# Patient Record
Sex: MALE | URBAN - METROPOLITAN AREA
[De-identification: names, ages, dates, MRNs, and addresses within clinical notes are randomized per-mention and may not be internally consistent; named-entity substitution may affect disease eponyms.]

---

## 2017-06-02 ENCOUNTER — HOSPITAL ENCOUNTER (EMERGENCY)
Facility: CLINIC | Age: 42
Discharge: HOME | End: 2017-06-03
Attending: EMERGENCY MEDICINE | Admitting: EMERGENCY MEDICINE

## 2017-06-02 DIAGNOSIS — I47.10 PAROXYSMAL SUPRAVENTRICULAR TACHYCARDIA (H): ICD-10-CM

## 2017-06-02 LAB
ALBUMIN SERPL-MCNC: 4 G/DL (ref 3.4–5)
ALP SERPL-CCNC: 57 U/L (ref 40–150)
ALT SERPL W P-5'-P-CCNC: 87 U/L (ref 0–70)
ANION GAP SERPL CALCULATED.3IONS-SCNC: 10 MMOL/L (ref 3–14)
AST SERPL W P-5'-P-CCNC: 47 U/L (ref 0–45)
BASOPHILS # BLD AUTO: 0 10E9/L (ref 0–0.2)
BASOPHILS NFR BLD AUTO: 0.3 %
BILIRUB SERPL-MCNC: 0.5 MG/DL (ref 0.2–1.3)
BUN SERPL-MCNC: 14 MG/DL (ref 7–30)
CALCIUM SERPL-MCNC: 9.1 MG/DL (ref 8.5–10.1)
CHLORIDE SERPL-SCNC: 105 MMOL/L (ref 94–109)
CO2 SERPL-SCNC: 26 MMOL/L (ref 20–32)
CREAT SERPL-MCNC: 1.52 MG/DL (ref 0.66–1.25)
DIFFERENTIAL METHOD BLD: ABNORMAL
EOSINOPHIL # BLD AUTO: 0.3 10E9/L (ref 0–0.7)
EOSINOPHIL NFR BLD AUTO: 2.3 %
ERYTHROCYTE [DISTWIDTH] IN BLOOD BY AUTOMATED COUNT: 13.2 % (ref 10–15)
GFR SERPL CREATININE-BSD FRML MDRD: 50 ML/MIN/1.7M2
GLUCOSE SERPL-MCNC: 156 MG/DL (ref 70–99)
HCT VFR BLD AUTO: 43.2 % (ref 40–53)
HGB BLD-MCNC: 15.4 G/DL (ref 13.3–17.7)
IMM GRANULOCYTES # BLD: 0.1 10E9/L (ref 0–0.4)
IMM GRANULOCYTES NFR BLD: 0.4 %
LYMPHOCYTES # BLD AUTO: 4 10E9/L (ref 0.8–5.3)
LYMPHOCYTES NFR BLD AUTO: 33.6 %
MCH RBC QN AUTO: 32 PG (ref 26.5–33)
MCHC RBC AUTO-ENTMCNC: 35.6 G/DL (ref 31.5–36.5)
MCV RBC AUTO: 90 FL (ref 78–100)
MONOCYTES # BLD AUTO: 0.9 10E9/L (ref 0–1.3)
MONOCYTES NFR BLD AUTO: 7.1 %
NEUTROPHILS # BLD AUTO: 6.8 10E9/L (ref 1.6–8.3)
NEUTROPHILS NFR BLD AUTO: 56.3 %
NRBC # BLD AUTO: 0 10*3/UL
NRBC BLD AUTO-RTO: 0 /100
PLATELET # BLD AUTO: 214 10E9/L (ref 150–450)
POTASSIUM SERPL-SCNC: 3.9 MMOL/L (ref 3.4–5.3)
PROT SERPL-MCNC: 7.5 G/DL (ref 6.8–8.8)
RBC # BLD AUTO: 4.82 10E12/L (ref 4.4–5.9)
SODIUM SERPL-SCNC: 141 MMOL/L (ref 133–144)
T4 FREE SERPL-MCNC: 1.16 NG/DL (ref 0.76–1.46)
TROPONIN I SERPL-MCNC: NORMAL UG/L (ref 0–0.04)
TSH SERPL DL<=0.005 MIU/L-ACNC: 6.39 MU/L (ref 0.4–4)
WBC # BLD AUTO: 12 10E9/L (ref 4–11)

## 2017-06-02 PROCEDURE — 96374 THER/PROPH/DIAG INJ IV PUSH: CPT

## 2017-06-02 PROCEDURE — 99284 EMERGENCY DEPT VISIT MOD MDM: CPT | Mod: 25

## 2017-06-02 PROCEDURE — 85025 COMPLETE CBC W/AUTO DIFF WBC: CPT | Performed by: EMERGENCY MEDICINE

## 2017-06-02 PROCEDURE — 84439 ASSAY OF FREE THYROXINE: CPT | Performed by: EMERGENCY MEDICINE

## 2017-06-02 PROCEDURE — 25000128 H RX IP 250 OP 636

## 2017-06-02 PROCEDURE — 93005 ELECTROCARDIOGRAM TRACING: CPT

## 2017-06-02 PROCEDURE — 93005 ELECTROCARDIOGRAM TRACING: CPT | Mod: 76

## 2017-06-02 PROCEDURE — 80053 COMPREHEN METABOLIC PANEL: CPT | Performed by: EMERGENCY MEDICINE

## 2017-06-02 PROCEDURE — 84443 ASSAY THYROID STIM HORMONE: CPT | Performed by: EMERGENCY MEDICINE

## 2017-06-02 PROCEDURE — 84484 ASSAY OF TROPONIN QUANT: CPT | Performed by: EMERGENCY MEDICINE

## 2017-06-02 RX ORDER — ADENOSINE 3 MG/ML
INJECTION, SOLUTION INTRAVENOUS
Status: COMPLETED
Start: 2017-06-02 | End: 2017-06-02

## 2017-06-02 RX ORDER — ADENOSINE 3 MG/ML
6 INJECTION, SOLUTION INTRAVENOUS ONCE
Status: COMPLETED | OUTPATIENT
Start: 2017-06-02 | End: 2017-06-02

## 2017-06-02 RX ADMIN — ADENOSINE 6 MG: 3 INJECTION, SOLUTION INTRAVENOUS at 22:39

## 2017-06-02 ASSESSMENT — ENCOUNTER SYMPTOMS
CHILLS: 0
VOMITING: 0
NUMBNESS: 0
SHORTNESS OF BREATH: 1
DIAPHORESIS: 1
NAUSEA: 1
COUGH: 0
FEVER: 0
LIGHT-HEADEDNESS: 1
PALPITATIONS: 1

## 2017-06-02 NOTE — ED AVS SNAPSHOT
Emergency Department    [address] [facility] [address]    Phone:  [phone]    Fax:  [phone]                                       Dev Lemon   MRN: [id number]    Department:   Emergency Department   Date of Visit:  6/2/2017           Patient Information     Date Of Birth          1975        Your diagnoses for this visit were:     Paroxysmal supraventricular tachycardia (H)        You were seen by Christopher Dubon MD and Popeye Husain MD.      Follow-up Information     Schedule an appointment as soon as possible for a visit with Dion Nunez MD.    Specialty:  Family Practice    Contact information:    [facility]  [address]  [facility] [place] [address] [phone]          Schedule an appointment as soon as possible for a visit with Loc Muñiz MD.    Specialty:  Cardiology    Why:  recheck ed if recurrent symptoms    Contact information:     [facility]  [address]  [address]  [facility] [id number] [phone]        Discharge References/Attachments     SUPRAVENTRICULAR TACHYCARDIA (SVT), UNDERSTANDING  (ENGLISH)    SUPRAVENTRICULAR TACHYCARDIA (SVT), TREATMENT FOR  (ENGLISH)      24 Hour Appointment Hotline       To make an appointment at any [facility], call [phone] ([phone]). If you don't have a family doctor or clinic, we will help you find one. [facility] are conveniently located to serve the needs of you and your family.             Review of your medicines      Notice     You have not been prescribed any medications.            Procedures and tests performed during your visit     CBC with platelets differential    Comprehensive metabolic panel    EKG 12 lead    T4 free    TSH with free T4 reflex    Troponin I      Orders Needing Specimen Collection     None      Pending Results     No orders found for last 3 day(s).            Pending Culture Results     No orders found for last 3 day(s).             Pending Results Instructions     If you had any lab results that were not finalized at the time of your Discharge, you can call the ED Lab Result RN at [phone]. You will be contacted by this team for any positive Lab results or changes in treatment. The nurses are available 7 days a week from 10A to 6:30P.  You can leave a message 24 hours per day and they will return your call.        Test Results From Your Hospital Stay        6/2/2017 10:41 PM      Component Results     Component Value Ref Range & Units Status    WBC 12.0 (H) 4.0 - 11.0 10e9/L Final    RBC Count 4.82 4.4 - 5.9 10e12/L Final    Hemoglobin 15.4 13.3 - 17.7 g/dL Final    Hematocrit 43.2 40.0 - 53.0 % Final    MCV 90 78 - 100 fl Final    MCH 32.0 26.5 - 33.0 pg Final    MCHC 35.6 31.5 - 36.5 g/dL Final    RDW 13.2 10.0 - 15.0 % Final    Platelet Count 214 150 - 450 10e9/L Final    Diff Method Automated Method  Final    % Neutrophils 56.3 % Final    % Lymphocytes 33.6 % Final    % Monocytes 7.1 % Final    % Eosinophils 2.3 % Final    % Basophils 0.3 % Final    % Immature Granulocytes 0.4 % Final    Nucleated RBCs 0 0 /100 Final    Absolute Neutrophil 6.8 1.6 - 8.3 10e9/L Final    Absolute Lymphocytes 4.0 0.8 - 5.3 10e9/L Final    Absolute Monocytes 0.9 0.0 - 1.3 10e9/L Final    Absolute Eosinophils 0.3 0.0 - 0.7 10e9/L Final    Absolute Basophils 0.0 0.0 - 0.2 10e9/L Final    Abs Immature Granulocytes 0.1 0 - 0.4 10e9/L Final    Absolute Nucleated RBC 0.0  Final         6/2/2017 11:01 PM      Component Results     Component Value Ref Range & Units Status    Sodium 141 133 - 144 mmol/L Final    Potassium 3.9 3.4 - 5.3 mmol/L Final    Chloride 105 94 - 109 mmol/L Final    Carbon Dioxide 26 20 - 32 mmol/L Final    Anion Gap 10 3 - 14 mmol/L Final    Glucose 156 (H) 70 - 99 mg/dL Final    Urea Nitrogen 14 7 - 30 mg/dL Final    Creatinine 1.52 (H) 0.66 - 1.25 mg/dL Final    GFR Estimate 50 (L) >60 mL/min/1.7m2 Final    Non African American GFR Calc     GFR Estimate If Black 61 >60 mL/min/1.7m2 Final    African American GFR Calc    Calcium 9.1 8.5 - 10.1 mg/dL Final    Bilirubin Total 0.5 0.2 - 1.3 mg/dL Final    Albumin 4.0 3.4 - 5.0 g/dL Final    Protein Total 7.5 6.8 - 8.8 g/dL Final    Alkaline Phosphatase 57 40 - 150 U/L Final    ALT 87 (H) 0 - 70 U/L Final    AST 47 (H) 0 - 45 U/L Final         6/2/2017 11:01 PM      Component Results     Component Value Ref Range & Units Status    Troponin I ES  0.000 - 0.045 ug/L Final    <0.015  The 99th percentile for upper reference range is 0.045 ug/L.  Troponin values in   the range of 0.045 - 0.120 ug/L may be associated with risks of adverse   clinical events.           6/2/2017 11:06 PM      Component Results     Component Value Ref Range & Units Status    TSH 6.39 (H) 0.40 - 4.00 mU/L Final         6/2/2017 11:19 PM      Component Results     Component Value Ref Range & Units Status    T4 Free 1.16 0.76 - 1.46 ng/dL Final                Clinical Quality Measure: Blood Pressure Screening     Your blood pressure was checked while you were in the emergency department today. The last reading we obtained was  BP: (!) 117/92 . Please read the guidelines below about what these numbers mean and what you should do about them.  If your systolic blood pressure (the top number) is less than 120 and your diastolic blood pressure (the bottom number) is less than 80, then your blood pressure is normal. There is nothing more that you need to do about it.  If your systolic blood pressure (the top number) is 120-139 or your diastolic blood pressure (the bottom number) is 80-89, your blood pressure may be higher than it should be. You should have your blood pressure rechecked within a year by a primary care provider.  If your systolic blood pressure (the top number) is 140 or greater or your diastolic blood pressure (the bottom number) is 90 or greater, you may have high blood pressure. High blood pressure is treatable, but if  "left untreated over time it can put you at risk for heart attack, stroke, or kidney failure. You should have your blood pressure rechecked by a primary care provider within the next 4 weeks.  If your provider in the emergency department today gave you specific instructions to follow-up with your doctor or provider even sooner than that, you should follow that instruction and not wait for up to 4 weeks for your follow-up visit.        Thank you for choosing [facility]       Thank you for choosing [facility] for your care. Our goal is always to provide you with excellent care. Hearing back from our patients is one way we can continue to improve our services. Please take a few minutes to complete the written survey that you may receive in the mail after you visit with us. Thank you!        Musicmetrichart Information     Sovi lets you send messages to your doctor, view your test results, renew your prescriptions, schedule appointments and more. To sign up, go to [email or web address] . Click on \"Log in\" on the left side of the screen, which will take you to the Welcome page. Then click on \"Sign up Now\" on the right side of the page.     You will be asked to enter the access code listed below, as well as some personal information. Please follow the directions to create your username and password.     Your access code is: [email or web address]  Expires: 2017 11:57 PM     Your access code will  in 90 days. If you need help or a new code, please call your [facility] clinic or [phone].        Care EveryWhere ID     This is your Care EveryWhere ID. This could be used by other organizations to access your [facility] medical records  [id number]        After Visit Summary       This is your record. Keep this with you and show to your community pharmacist(s) and doctor(s) at your next visit.                  "

## 2017-06-02 NOTE — ED AVS SNAPSHOT
Emergency Department    [address] [place] [address]    Phone:  [phone]    Fax:  [phone]                                       Dev Lemon   MRN: [id number]    Department:   Emergency Department   Date of Visit:  6/2/2017           After Visit Summary Signature Page     I have received my discharge instructions, and my questions have been answered. I have discussed any challenges I see with this plan with the nurse or doctor.    ..........................................................................................................................................  Patient/Patient Representative Signature      ..........................................................................................................................................  Patient Representative Print Name and Relationship to Patient    ..................................................               ................................................  Date                                            Time    ..........................................................................................................................................  Reviewed by Signature/Title    ...................................................              ..............................................  Date                                                            Time

## 2017-06-03 VITALS
HEART RATE: 200 BPM | SYSTOLIC BLOOD PRESSURE: 117 MMHG | RESPIRATION RATE: 11 BRPM | OXYGEN SATURATION: 98 % | TEMPERATURE: 97.6 F | DIASTOLIC BLOOD PRESSURE: 92 MMHG

## 2017-06-03 LAB — INTERPRETATION ECG - MUSE: NORMAL

## 2017-06-03 ASSESSMENT — ENCOUNTER SYMPTOMS
FATIGUE: 1
WEAKNESS: 1

## 2017-06-03 NOTE — ED PROVIDER NOTES
History     Chief Complaint:  Lightheadedness     HPI   Dev Lemon is a 42 year old male who presents with lightheadedness. Around 2000 tonight patient reports sudden onset lightheadedness associated with rapid palpitations, shortness of breath, diaphoresis, general fatigue/weakness, and mild nausea.  No chest pain, cough or hemoptysis, syncope, or any other acute symptoms.       CARDIAC RISK FACTORS:  Sex:    male  Tobacco:   Non-smoker  Hypertension:   no  Hyperlipidemia:  no  Diabetes:   no  Family History:  no      Allergies:  Phenobarbital      Medications:    None    Past Medical History:    Seizure disorder    Past Surgical History:    Orthopedic surgery    Family History:    None    Social History:  Non-smoker, non-drinker.       Review of Systems   Constitutional: Positive for diaphoresis and fatigue. Negative for chills and fever.   Respiratory: Positive for shortness of breath. Negative for cough.    Cardiovascular: Positive for palpitations. Negative for chest pain.   Gastrointestinal: Positive for nausea. Negative for vomiting.   Neurological: Positive for weakness (generalized) and light-headedness. Negative for syncope and numbness.   All other systems reviewed and are negative.        Physical Exam   First Vitals:  BP 79/57  Pulse: 200  Temp: 97.6  F (36.4  C)  Resp: 18  SpO2: 96 %    Physical Exam  Constitutional:  male sitting and talking.   HENT: No signs of trauma.   Eyes: EOM are normal. Pupils are equal, round, and reactive to light.   Neck: Normal range of motion. No JVD present. No cervical adenopathy. No carotid bruit   Cardiovascular: Regular rapid rhythm.  Exam reveals no gallop and no friction rub.    No murmur heard.   1+ radial and femoral pulses bilaterally.   Pulmonary/Chest: Bilateral breath sounds normal. No wheezes, rhonchi or rales.    Abdominal: Soft. No tenderness. No rebound or guarding.   Musculoskeletal: No edema. No tenderness.   Lymphadenopathy: No  lymphadenopathy.   Neurological: Alert and oriented to person, place, and time. Normal strength. Coordination normal.   Skin: Skin is warm and dry. No rash noted. No erythema.      Emergency Department Course   ECG:  ECG (22:19:46):  Rate 201 bpm. QRS duration 78. QT/QTc 222/406.   SVT. Nonspecific ST changes. Interpreted at 6 by Popeye Husain MD.     ECG (22:40:35): post cardioversion  Rate 101 bpm. MA interval 150. QRS duration 86. QT/QTc 332/430. P-R-T axes 62-50-51. Sinus tachycardia. Otherwise normal ECG.  Interpreted by Popeye Husain MD.       Laboratory:  CMP: Glu 156, Cr 1.52, eGFR 50, ALT 87, AST 47  CBC w/diff: WBC 12.0, Hgb 15.4, Plt 214   TSH: 6.39, free T4 1.16  Troponin-I: <0.015    Procedure;  Chemical cardioversion:  : Adenosine 6mg, IV    Emergency Department Course:  Past medical records, nursing notes, and vitals reviewed.  : I performed an exam of the patient and obtained history, as documented above. Placed on cardiac monitors and continuous pulse oximetry.  2400: I rechecked the patient. Symptoms improved. Findings and plan explained to the Patient. Patient discharged home with instructions regarding supportive care, medications, and reasons to return. The importance of close follow-up was reviewed.         Impression & Plan    Medical Decision Makin year old male who came into the ED complaining of rapid heartbeat and feeling short of breath and weak. Patient notes this came on a few hours ago. He has never had anything like this before. He denies any hypertension, diabetes, or cholesterol problems. Denies any smoking or illicit drug use. Denies any over the counter med use.  Nobody in his family has had an heart problems that he is aware of. He has no risk factors for blood clots. His only past medical history is seizures as a child. On exam he has borderline blood pressure and a rapid heart rate. EKG shows SVT as high as 190 with BP < 90.  IV was started  and labs were obtained. Patient placed on O2.  I considered cardioversion given chest pain and low blood pressure, but elected to try 1 dose of adenosine first which was successfuland converted him to a NSR. Labs revealed mild elevation of liver functions as well as TSH, however T4 was normal. His troponin was also normal. The patient has been symptom free in the two hours observed after he was converted, and he will be discharged home. I have given him primary care referral to Dr. Dion Nunez at the [facility] and have also recommended he make a follow up appointment with Dr. KARLOS Muñiz of Electrophysiology. He should call next week for this.  If he has recurrent symptoms he may need to return to the ED.    Diagnosis:    ICD-10-CM    1. Paroxysmal supraventricular tachycardia (H) I47.1    2.      Mild elevation of liver functions    Critical care time exclusive of procedures;  35 minutes  Plan:  As above      IJay Jay, am serving as a scribe at 12:03 AM on 6/3/2017 to document services personally performed by Popeye Husain MD based on my observations and the provider's statements to me.    Jay Jay Chu  6/2/2017    EMERGENCY DEPARTMENT       Popeye Husain MD  06/03/17 0052

## 2017-06-09 ENCOUNTER — TELEPHONE (OUTPATIENT)
Dept: CARDIOLOGY | Facility: CLINIC | Age: 42
End: 2017-06-09

## 2017-06-09 NOTE — TELEPHONE ENCOUNTER
Scheduling to call pt to see if he can be rescheduled to see an EP MD next week, as that is what the ED MD recommended. Pt seen recently in ED for SVT. Lucio STEEL

## 2017-06-15 ENCOUNTER — OFFICE VISIT (OUTPATIENT)
Dept: CARDIOLOGY | Facility: CLINIC | Age: 42
End: 2017-06-15

## 2017-06-15 VITALS
BODY MASS INDEX: 32.8 KG/M2 | HEART RATE: 74 BPM | SYSTOLIC BLOOD PRESSURE: 126 MMHG | WEIGHT: 209 LBS | HEIGHT: 67 IN | DIASTOLIC BLOOD PRESSURE: 88 MMHG

## 2017-06-15 DIAGNOSIS — I47.10 PAROXYSMAL SUPRAVENTRICULAR TACHYCARDIA (H): Primary | ICD-10-CM

## 2017-06-15 NOTE — PROGRESS NOTES
HISTORY OF PRESENT ILLNESS:   It was my pleasure to see Dev today at the request of the ER physician for evaluation of his SVT.  As you know, Mihai is a 42-year-old patient previously healthy, who came to the ER because of an episode of palpitations and noted to be in a narrow complex tachycardia at a rate of 200 beats per minute, 6 mg of adenosine with termination of it after failed Valsalva maneuvers.  Baseline EKG demonstrated no evidence of ventricular pre-excitation.      Mihai mentioned this was the first time he felt something like, as his heart was pounding, but no dizziness or lightheadedness.  We discussed at length about the benign prognosis of PSVT in general, however, the symptoms could be quite severe and in his case, it was quite fast.        We discussed treatment options including medical therapy versus an ablation.  I also told the patient that unfortunately this can recur in the future and 1 option is to monitor as this point in time, especially as he has no insurance, and once he has insurance, we can consider an ablation, especially if they recur in the future.  In the meantime, I tried giving him samples of beta blocker, but we do not have it in our stock.  I gave him a prescription for 25 mg metoprolol to take q.12h. p.r.n. should he go into SVT in the future.        I will talk to our Billing Department to waive my fee for this visit.         DINORAH GANNON MD             D: 06/15/2017 11:48   T: 06/15/2017 18:04   MT: CHICHI      Name:     DEV WREN   MRN:      [id number]        Account:      [id number]   :      1975           Service Date: 06/15/2017      Document: [id number]

## 2017-06-15 NOTE — PROGRESS NOTES
"HPI and Plan:   See dictation  [id number]  No orders of the defined types were placed in this encounter.      No orders of the defined types were placed in this encounter.      There are no discontinued medications.      No diagnosis found.    CURRENT MEDICATIONS:  No current outpatient prescriptions on file.       ALLERGIES     Allergies   Allergen Reactions     Phenobarbital        PAST MEDICAL HISTORY:  Past Medical History:   Diagnosis Date     Seizures (H)        PAST SURGICAL HISTORY:  Past Surgical History:   Procedure Laterality Date     ORTHOPEDIC SURGERY         FAMILY HISTORY:  History reviewed. No pertinent family history.    SOCIAL HISTORY:  Social History     Social History     Marital status: Single     Spouse name: N/A     Number of children: N/A     Years of education: N/A     Social History Main Topics     Smoking status: Never Smoker     Smokeless tobacco: None     Alcohol use No     Drug use: No     Sexual activity: Not Asked     Other Topics Concern     Parent/Sibling W/ Cabg, Mi Or Angioplasty Before 65f 55m? No     Social History Narrative       Review of Systems:  Skin:  Negative       Eyes:  Negative      ENT:  Negative      Respiratory:  Negative       Cardiovascular:    Positive for;palpitations one episode of rapid heart beat   Gastroenterology: Negative      Genitourinary:  Negative      Musculoskeletal:  Negative      Neurologic:  Negative      Psychiatric:  Negative      Heme/Lymph/Imm:  Negative      Endocrine:  Negative        Physical Exam:  Vitals: /88  Pulse 74  Ht 1.702 m (5' 7\")  Wt 94.8 kg (209 lb)  BMI 32.73 kg/m2    Constitutional:           Skin:           Head:           Eyes:           ENT:           Neck:           Chest:             Cardiac:                    Abdomen:           Vascular:                                          Extremities and Back:                 Neurological:                 CC  No referring provider defined for this encounter.              "

## 2017-06-15 NOTE — MR AVS SNAPSHOT
"              After Visit Summary   6/15/2017    Dev Lemon    MRN: [id number]           Patient Information     Date Of Birth          1975        Visit Information        Provider Department      6/15/2017 11:15 AM Sonido Schaeffer MD [facility] [place]        Today's Diagnoses     Paroxysmal supraventricular tachycardia (H)    -  1       Follow-ups after your visit        Who to contact     If you have questions or need follow up information about today's clinic visit or your schedule please contact [facility] directly at [phone].  Normal or non-critical lab and imaging results will be communicated to you by ClearKarmahart, letter or phone within 4 business days after the clinic has received the results. If you do not hear from us within 7 days, please contact the clinic through ClearKarmahart or phone. If you have a critical or abnormal lab result, we will notify you by phone as soon as possible.  Submit refill requests through Acopio or call your pharmacy and they will forward the refill request to us. Please allow 3 business days for your refill to be completed.          Additional Information About Your Visit        MyChart Information     Acopio lets you send messages to your doctor, view your test results, renew your prescriptions, schedule appointments and more. To sign up, go to [email or web address] . Click on \"Log in\" on the left side of the screen, which will take you to the Welcome page. Then click on \"Sign up Now\" on the right side of the page.     You will be asked to enter the access code listed below, as well as some personal information. Please follow the directions to create your username and password.     Your access code is: [email or web address]  Expires: 2017 11:57 PM     Your access code will  in 90 days. If you need help or a new code, please call your [facility] clinic or [phone].        Care EveryWhere " "ID     This is your Care EveryWhere ID. This could be used by other organizations to access your [facility] medical records  [id number]        Your Vitals Were     Pulse Height BMI (Body Mass Index)             74 1.702 m (5' 7\") 32.73 kg/m2          Blood Pressure from Last 3 Encounters:   06/15/17 126/88   06/03/17 (!) 117/92    Weight from Last 3 Encounters:   06/15/17 94.8 kg (209 lb)              Today, you had the following     No orders found for display       Primary Care Provider    Physician No Ref-Primary       No address on file        Equal Access to Services     [facility]: Hadii aad everton matuteo Socolleen, waaxda luqadaha, qaybta kaalmada adarsh, kendall wade . So [facility] [phone].    ATENCIÓN: Si habla español, tiene a prince disposición servicios gratuitos de asistencia lingüística. Llame al [phone].    We comply with applicable federal civil rights laws and [place] laws. We do not discriminate on the basis of race, color, national origin, age, disability sex, sexual orientation or gender identity.            Thank you!     Thank you for choosing [facility] [place]  for your care. Our goal is always to provide you with excellent care. Hearing back from our patients is one way we can continue to improve our services. Please take a few minutes to complete the written survey that you may receive in the mail after your visit with us. Thank you!             Your Updated Medication List - Protect others around you: Learn how to safely use, store and throw away your medicines at www.disposemymeds.org.      Notice  As of 6/15/2017 11:59 PM    You have not been prescribed any medications.      "

## 2017-10-24 ENCOUNTER — HOSPITAL ENCOUNTER (EMERGENCY)
Facility: CLINIC | Age: 42
Discharge: HOME | End: 2017-10-25
Attending: EMERGENCY MEDICINE | Admitting: EMERGENCY MEDICINE

## 2017-10-24 DIAGNOSIS — Z86.79 H/O SUPRAVENTRICULAR TACHYCARDIA: ICD-10-CM

## 2017-10-24 DIAGNOSIS — R07.9 ACUTE CHEST PAIN: Primary | ICD-10-CM

## 2017-10-24 DIAGNOSIS — R00.2 PALPITATIONS: ICD-10-CM

## 2017-10-24 PROCEDURE — 85379 FIBRIN DEGRADATION QUANT: CPT | Performed by: EMERGENCY MEDICINE

## 2017-10-24 PROCEDURE — 84484 ASSAY OF TROPONIN QUANT: CPT | Performed by: EMERGENCY MEDICINE

## 2017-10-24 PROCEDURE — 80048 BASIC METABOLIC PNL TOTAL CA: CPT | Performed by: EMERGENCY MEDICINE

## 2017-10-24 PROCEDURE — 93005 ELECTROCARDIOGRAM TRACING: CPT

## 2017-10-24 PROCEDURE — 99285 EMERGENCY DEPT VISIT HI MDM: CPT | Mod: 25

## 2017-10-24 PROCEDURE — 85025 COMPLETE CBC W/AUTO DIFF WBC: CPT | Performed by: EMERGENCY MEDICINE

## 2017-10-24 RX ORDER — ASPIRIN 325 MG
325 TABLET ORAL ONCE
Status: COMPLETED | OUTPATIENT
Start: 2017-10-24 | End: 2017-10-25

## 2017-10-24 ASSESSMENT — ENCOUNTER SYMPTOMS
DIAPHORESIS: 1
PALPITATIONS: 1
WEAKNESS: 1
SHORTNESS OF BREATH: 1
FEVER: 0
VOMITING: 1
CHEST TIGHTNESS: 1

## 2017-10-24 NOTE — ED AVS SNAPSHOT
Emergency Department    [address] [place] [address]    Phone:  [phone]    Fax:  [phone]                                       Dev Lemon   MRN: [id number]    Department:   Emergency Department   Date of Visit:  10/24/2017           After Visit Summary Signature Page     I have received my discharge instructions, and my questions have been answered. I have discussed any challenges I see with this plan with the nurse or doctor.    ..........................................................................................................................................  Patient/Patient Representative Signature      ..........................................................................................................................................  Patient Representative Print Name and Relationship to Patient    ..................................................               ................................................  Date                                            Time    ..........................................................................................................................................  Reviewed by Signature/Title    ...................................................              ..............................................  Date                                                            Time

## 2017-10-24 NOTE — ED AVS SNAPSHOT
Emergency Department    [address] [facility] [address]    Phone:  [phone]    Fax:  [phone]                                       Dev Lemon   MRN: [id number]    Department:   Emergency Department   Date of Visit:  10/24/2017           Patient Information     Date Of Birth          1975        Your diagnoses for this visit were:     Acute chest pain     Palpitations     H/O supraventricular tachycardia        You were seen by Matthew Rockwell MD.      Follow-up Information     Follow up with  Emergency Department.    Specialty:  EMERGENCY MEDICINE    Why:  If symptoms worsen    Contact information:    [address] [facility] [id number] [phone]        Schedule an appointment as soon as possible for a visit with Sonido Schaeffer MD.    Specialty:  Cardiology    Why:  to follow up from repeat episode of palpitations, concern for repeated SVT episode    Contact information:    [address]  [facility] [id number] [phone]          Discharge Instructions       Discharge Instructions  Chest Pain    You have been seen today for chest pain or discomfort.  At this time, your provider has found no signs that your chest pain is due to a serious or life-threatening condition, (or you have declined more testing and/or admission to the hospital). However, sometimes there is a serious problem that does not show up right away. Your evaluation today may not be complete and you may need further testing and evaluation.     Generally, every Emergency Department visit should have a follow-up clinic visit with either a primary or a specialty clinic/provider. Please follow-up as instructed by your emergency provider today.  Return to the Emergency Department if:    Your chest pain changes, gets worse, starts to happen more often, or comes with less activity.    You are newly short of breath.    You get very weak or tired.    You pass out or faint.    You have any new  symptoms, like fever, cough, numb legs, or you cough up blood.    You have anything else that worries you.    Until you follow-up with your regular provider, please do the following:    Take one aspirin daily unless you have an allergy or are told not to by your provider.    If a stress test appointment has been made, go to the appointment.    If you have questions, contact your regular provider.    Follow-up with your regular provider/clinic as directed; this is very important.    If you were given a prescription for medicine here today, be sure to read all of the information (including the package insert) that comes with your prescription.  This will include important information about the medicine, its side effects, and any warnings that you need to know about.  The pharmacist who fills the prescription can provide more information and answer questions you may have about the medicine.  If you have questions or concerns that the pharmacist cannot address, please call or return to the Emergency Department.       Remember that you can always come back to the Emergency Department if you are not able to see your regular provider in the amount of time listed above, if you get any new symptoms, or if there is anything that worries you.      24 Hour Appointment Hotline       To make an appointment at any [facility], call [phone] ([phone]). If you don't have a family doctor or clinic, we will help you find one. [place] clinics are conveniently located to serve the needs of you and your family.          ED Discharge Orders     Follow-Up with Electrophysiologist                    Review of your medicines      Notice     You have not been prescribed any medications.            Procedures and tests performed during your visit     Basic metabolic panel    CBC with platelets differential    Cardiac Continuous Monitoring    D dimer quantitative    EKG 12-lead, tracing only    Troponin I    XR Chest 2 Views       Orders Needing Specimen Collection     None      Pending Results     Date and Time Order Name Status Description    10/24/2017 2340 XR Chest 2 Views Preliminary             Pending Culture Results     No orders found for last 3 day(s).            Pending Results Instructions     If you had any lab results that were not finalized at the time of your Discharge, you can call the ED Lab Result RN at [phone]. You will be contacted by this team for any positive Lab results or changes in treatment. The nurses are available 7 days a week from 10A to 6:30P.  You can leave a message 24 hours per day and they will return your call.        Test Results From Your Hospital Stay        10/25/2017 12:03 AM      Component Results     Component Value Ref Range & Units Status    WBC 10.0 4.0 - 11.0 10e9/L Final    RBC Count 4.73 4.4 - 5.9 10e12/L Final    Hemoglobin 15.0 13.3 - 17.7 g/dL Final    Hematocrit 41.7 40.0 - 53.0 % Final    MCV 88 78 - 100 fl Final    MCH 31.7 26.5 - 33.0 pg Final    MCHC 36.0 31.5 - 36.5 g/dL Final    RDW 13.1 10.0 - 15.0 % Final    Platelet Count 156 150 - 450 10e9/L Final    Diff Method Automated Method  Final    % Neutrophils 71.6 % Final    % Lymphocytes 21.3 % Final    % Monocytes 5.9 % Final    % Eosinophils 0.8 % Final    % Basophils 0.1 % Final    % Immature Granulocytes 0.3 % Final    Nucleated RBCs 0 0 /100 Final    Absolute Neutrophil 7.2 1.6 - 8.3 10e9/L Final    Absolute Lymphocytes 2.1 0.8 - 5.3 10e9/L Final    Absolute Monocytes 0.6 0.0 - 1.3 10e9/L Final    Absolute Eosinophils 0.1 0.0 - 0.7 10e9/L Final    Absolute Basophils 0.0 0.0 - 0.2 10e9/L Final    Abs Immature Granulocytes 0.0 0 - 0.4 10e9/L Final    Absolute Nucleated RBC 0.0  Final         10/25/2017 12:18 AM      Component Results     Component Value Ref Range & Units Status    Sodium 141 133 - 144 mmol/L Final    Potassium 3.9 3.4 - 5.3 mmol/L Final    Chloride 105 94 - 109 mmol/L Final    Carbon Dioxide 28 20 - 32  mmol/L Final    Anion Gap 8 3 - 14 mmol/L Final    Glucose 147 (H) 70 - 99 mg/dL Final    Urea Nitrogen 12 7 - 30 mg/dL Final    Creatinine 1.40 (H) 0.66 - 1.25 mg/dL Final    GFR Estimate 55 (L) >60 mL/min/1.7m2 Final    Non  GFR Calc    GFR Estimate If Black 66 >60 mL/min/1.7m2 Final    African American GFR Calc    Calcium 9.1 8.5 - 10.1 mg/dL Final         10/25/2017 12:20 AM      Component Results     Component Value Ref Range & Units Status    Troponin I ES 0.039 0.000 - 0.045 ug/L Final    The 99th percentile for upper reference range is 0.045 ug/L.  Troponin values   in the range of 0.045 - 0.120 ug/L may be associated with risks of adverse   clinical events.           10/25/2017 12:08 AM      Narrative     XR CHEST 2 VW  10/25/2017 12:04 AM      HISTORY: Chest pain.     COMPARISON: None.    FINDINGS: The heart size is normal. The lungs are clear. No  pneumothorax or pleural effusion.        Impression     IMPRESSION: No acute abnormality.         10/25/2017 12:37 AM      Component Results     Component Value Ref Range & Units Status    D Dimer <0.3 0.0 - 0.50 ug/ml FEU Final    This D-dimer assay is intended for use in conjunction with a clinical pretest   probability assessment model to exclude pulmonary embolism (PE) and deep   venous thrombosis (DVT) in outpatients suspected of PE or DVT. The cut-off   value is 0.5 ug/mL FEU.                  Clinical Quality Measure: Blood Pressure Screening     Your blood pressure was checked while you were in the emergency department today. The last reading we obtained was  BP: 145/82 . Please read the guidelines below about what these numbers mean and what you should do about them.  If your systolic blood pressure (the top number) is less than 120 and your diastolic blood pressure (the bottom number) is less than 80, then your blood pressure is normal. There is nothing more that you need to do about it.  If your systolic blood pressure (the top number)  "is 120-139 or your diastolic blood pressure (the bottom number) is 80-89, your blood pressure may be higher than it should be. You should have your blood pressure rechecked within a year by a primary care provider.  If your systolic blood pressure (the top number) is 140 or greater or your diastolic blood pressure (the bottom number) is 90 or greater, you may have high blood pressure. High blood pressure is treatable, but if left untreated over time it can put you at risk for heart attack, stroke, or kidney failure. You should have your blood pressure rechecked by a primary care provider within the next 4 weeks.  If your provider in the emergency department today gave you specific instructions to follow-up with your doctor or provider even sooner than that, you should follow that instruction and not wait for up to 4 weeks for your follow-up visit.        Thank you for choosing [facility]       Thank you for choosing [facility] for your care. Our goal is always to provide you with excellent care. Hearing back from our patients is one way we can continue to improve our services. Please take a few minutes to complete the written survey that you may receive in the mail after you visit with us. Thank you!        [facility] Information     [employer] lets you send messages to your doctor, view your test results, renew your prescriptions, schedule appointments and more. To sign up, go to [email or web address] . Click on \"Log in\" on the left side of the screen, which will take you to the Welcome page. Then click on \"Sign up Now\" on the right side of the page.     You will be asked to enter the access code listed below, as well as some personal information. Please follow the directions to create your username and password.     Your access code is: [email or web address]  Expires: 2018 12:58 AM     Your access code will  in 90 days. If you need help or a new code, please call your [facility] clinic or [phone].        Care " EveryWhere ID     This is your Care EveryWhere ID. This could be used by other organizations to access your [place] medical records  [id number]        Equal Access to Services     SAM SMITH : Melecio Gonzalez, ulisses reyes, kevin altamirano, kendall rogers. So [facility] [place] [phone].    ATENCIÓN: Si habla español, tiene a prince disposición servicios gratuitos de asistencia lingüística. Llame al [phone].    We comply with applicable federal civil rights laws and [place] laws. We do not discriminate on the basis of race, color, national origin, age, disability, sex, sexual orientation, or gender identity.            After Visit Summary       This is your record. Keep this with you and show to your community pharmacist(s) and doctor(s) at your next visit.

## 2017-10-25 ENCOUNTER — APPOINTMENT (OUTPATIENT)
Dept: GENERAL RADIOLOGY | Facility: CLINIC | Age: 42
End: 2017-10-25
Attending: EMERGENCY MEDICINE

## 2017-10-25 VITALS
BODY MASS INDEX: 32.14 KG/M2 | DIASTOLIC BLOOD PRESSURE: 77 MMHG | WEIGHT: 217 LBS | OXYGEN SATURATION: 97 % | HEIGHT: 69 IN | TEMPERATURE: 97.6 F | HEART RATE: 96 BPM | SYSTOLIC BLOOD PRESSURE: 125 MMHG | RESPIRATION RATE: 18 BRPM

## 2017-10-25 LAB
ANION GAP SERPL CALCULATED.3IONS-SCNC: 8 MMOL/L (ref 3–14)
BASOPHILS # BLD AUTO: 0 10E9/L (ref 0–0.2)
BASOPHILS NFR BLD AUTO: 0.1 %
BUN SERPL-MCNC: 12 MG/DL (ref 7–30)
CALCIUM SERPL-MCNC: 9.1 MG/DL (ref 8.5–10.1)
CHLORIDE SERPL-SCNC: 105 MMOL/L (ref 94–109)
CO2 SERPL-SCNC: 28 MMOL/L (ref 20–32)
CREAT SERPL-MCNC: 1.4 MG/DL (ref 0.66–1.25)
D DIMER PPP FEU-MCNC: <0.3 UG/ML FEU (ref 0–0.5)
DIFFERENTIAL METHOD BLD: NORMAL
EOSINOPHIL # BLD AUTO: 0.1 10E9/L (ref 0–0.7)
EOSINOPHIL NFR BLD AUTO: 0.8 %
ERYTHROCYTE [DISTWIDTH] IN BLOOD BY AUTOMATED COUNT: 13.1 % (ref 10–15)
GFR SERPL CREATININE-BSD FRML MDRD: 55 ML/MIN/1.7M2
GLUCOSE SERPL-MCNC: 147 MG/DL (ref 70–99)
HCT VFR BLD AUTO: 41.7 % (ref 40–53)
HGB BLD-MCNC: 15 G/DL (ref 13.3–17.7)
IMM GRANULOCYTES # BLD: 0 10E9/L (ref 0–0.4)
IMM GRANULOCYTES NFR BLD: 0.3 %
INTERPRETATION ECG - MUSE: NORMAL
LYMPHOCYTES # BLD AUTO: 2.1 10E9/L (ref 0.8–5.3)
LYMPHOCYTES NFR BLD AUTO: 21.3 %
MCH RBC QN AUTO: 31.7 PG (ref 26.5–33)
MCHC RBC AUTO-ENTMCNC: 36 G/DL (ref 31.5–36.5)
MCV RBC AUTO: 88 FL (ref 78–100)
MONOCYTES # BLD AUTO: 0.6 10E9/L (ref 0–1.3)
MONOCYTES NFR BLD AUTO: 5.9 %
NEUTROPHILS # BLD AUTO: 7.2 10E9/L (ref 1.6–8.3)
NEUTROPHILS NFR BLD AUTO: 71.6 %
NRBC # BLD AUTO: 0 10*3/UL
NRBC BLD AUTO-RTO: 0 /100
PLATELET # BLD AUTO: 156 10E9/L (ref 150–450)
POTASSIUM SERPL-SCNC: 3.9 MMOL/L (ref 3.4–5.3)
RBC # BLD AUTO: 4.73 10E12/L (ref 4.4–5.9)
SODIUM SERPL-SCNC: 141 MMOL/L (ref 133–144)
TROPONIN I SERPL-MCNC: 0.04 UG/L (ref 0–0.04)
WBC # BLD AUTO: 10 10E9/L (ref 4–11)

## 2017-10-25 PROCEDURE — 25000132 ZZH RX MED GY IP 250 OP 250 PS 637: Performed by: EMERGENCY MEDICINE

## 2017-10-25 PROCEDURE — 25000128 H RX IP 250 OP 636: Performed by: EMERGENCY MEDICINE

## 2017-10-25 PROCEDURE — 96360 HYDRATION IV INFUSION INIT: CPT

## 2017-10-25 PROCEDURE — 71020 XR CHEST 2 VW: CPT

## 2017-10-25 RX ADMIN — SODIUM CHLORIDE 1000 ML: 9 INJECTION, SOLUTION INTRAVENOUS at 00:05

## 2017-10-25 RX ADMIN — ASPIRIN 325 MG ORAL TABLET 325 MG: 325 PILL ORAL at 00:05

## 2018-06-11 NOTE — ED PROVIDER NOTES
History     Chief Complaint:  Chest pain     HPI   Dev Lemon is a 42 year old male who presents to the emergency department today for evaluation of chest pain. Per chart review, the patient presented with similar symptoms on 6/2/17, was given adenosine, and reconverted. Today, the patient reports having chest pain and palpitations beginning at 2040 with associated shortness of breath, diaphoresis, finger tingling, weakness and chest tightness. He took metoprolol at 2100 with little relief. Therefore he presents to the emergency department for evaluation. On the way to the emergency department around 2300, he reports two episodes of emesis after which his symptoms resolved. On presentation, he denies current shortness of breath, chest pain, palpitations, or other symptoms. He denies fever and leg swelling, and states he was feeling fine prior to episode. He has not taken any aspirin today. Of note, the patient has never had a stress test done.    Cardiac/PE/DVT Risk Factors:  History of hypertension - negative   History of hyperlipidemia - negative   History of diabetes - negative   History of smoking - negative   Personal history of PE/DVT - negative   Family history of PE/DVT - negative   Family history of heart complications - positive   Recent travel - negative   Recent surgery - negative   Other immobilizations - negative   Cancer - negative     Allergies:  Phenobarbital     Medications:    Metoprolol    Past Medical History:    Seizures     Past Surgical History:    Orthopedic surgery     Family History:    Heart problems - grandmother    Social History:  The patient was accompanied to the ED by his girlfriend.  Smoking Status: Never  Alcohol Use: No  Marital Status:  Single      Review of Systems   Constitutional: Positive for diaphoresis. Negative for fever.   Respiratory: Positive for chest tightness and shortness of breath.    Cardiovascular: Positive for chest pain and palpitations. Negative for  "leg swelling.   Gastrointestinal: Positive for vomiting.   Neurological: Positive for weakness.        Finger tingling positive   All other systems reviewed and are negative.    Physical Exam     Patient Vitals for the past 24 hrs:   BP Temp Temp src Pulse Heart Rate Resp SpO2 Height Weight   10/25/17 0053 - - - - 89 20 97 % - -   10/25/17 0039 - - - - 94 16 97 % - -   10/25/17 0030 145/82 - - - 107 20 97 % - -   10/25/17 0005 130/81 - - - 104 14 95 % - -   10/24/17 2336 - - - - - - - 1.753 m (5' 9\") 98.4 kg (217 lb)   10/24/17 2334 113/84 97.6  F (36.4  C) Oral 111 - 16 99 % - -     Physical Exam  General: Alert, appears well-developed and well-nourished. Cooperative.     In no acute distress  HEENT:  Head:  Atraumatic  Ears:  External ears are normal  Mouth/Throat:  Oropharynx is without erythema or exudate and mucous membranes are moist.   Eyes:   Conjunctivae normal and EOM are normal. No scleral icterus.    Pupils are equal, round, and reactive to light.   CV:  Tachycardic, regular rhythm, normal heart sounds and radial and dorsalis pedis pulses are 2+ and symmetric.  No murmur.  Resp:  Breath sounds are clear bilaterally    Non-labored, no retractions or accessory muscle use  GI:  Abdomen is soft, no distension, no tenderness. No rebound or guarding.  MS:  Normal range of motion. No edema.    Normal strength in all 4 extremities.     Back atraumatic.    No CVA tenderness bilaterally  Skin:  Warm and dry.  No rash or lesions noted.  Neuro:   Alert. Normal strength.  Sensation intact in all 4 extremities. GCS: 15  Psych: Normal mood and affect.    Emergency Department Course     ECG:  Indication: chest pain   Completed at 2335.  Read at 2340.   Sinus tachycardia  ST elevation inferior and anterior leads  Likely early repolarization  Rate 105 bpm. NC interval 144. QRS duration 84. QT/QTc 318/420. P-R-T axes 49 29 29.    Imaging:  Radiology findings were communicated with the patient who voiced understanding of " the findings.  XR Chest 2 Views  IMPRESSION: No acute abnormality.  Report per radiology     Laboratory:  Laboratory findings were communicated with the patient who voiced understanding of the findings.  CBC: AWNL. (WBC 10.0, HGB 15.0, )   BMP: Glucose 147 (H), Creatinine 1.40 (H), GFR Estimate 55 (L), o/w WNL.  Troponin (Collected 2354): 0.039  D Dimer (Collected 2354): <0.3    Interventions:  0005 NS Bolus 1,000mL IV  0005 aspirin 325 mg PO     Emergency Department Course:  Nursing notes and vitals reviewed.  IV was inserted and blood was drawn for laboratory testing, results above.  The patient was sent for a XR Chest 2 Views while in the emergency department, results above.   2340: I performed an exam of the patient as documented above.   0053: Patient rechecked and updated.   Findings and plan explained to the Patient. Patient discharged home with instructions regarding supportive care, medications, and reasons to return. The importance of close follow-up was reviewed.  I personally reviewed the laboratory and imaging results with the Patient and answered all related questions prior to discharge.    Impression & Plan      Medical Decision Making:  Dev Lemon is a 42 year old male with history of supraventricular tachycardia who presents with what I suspect is a recurrent episode of supraventricular tachycardia. Patient had development of chest pain and palpitations at approximately 2040 this evening. He took an oral dose of metoprolol ~15 minutes after the onset of the palpitations, as he felt that this was similar to a prior presentation of SVT in June of this year. Patient had been seen by an electrophysiologist in follow up from June's emergency department visit and was counseled that they could trial oral medications vs an ablation. Patient chose to trial medications which is why he took the metoprolol this evening.     En route to the emergency department, patient had two episodes of vomiting.  At this time he had what I suspected self converted as he had resolution of all of his symptoms after vomiting.  Story is consistent with an episode of paroxysmal SVT.  Patient has no elevated troponin. ECG does have some nonspecific ST elevation which I suspect is due to some early repolarization. With a negative troponin, low concern for ACS at this time. Patient also has no reciprocal ST depressions concerning for STEMI. D dimer was negative, low concern for PE. Patient does have a mild creatinine elevation of 1.4, but this is consistent with his prior CMP four months ago with a creatinine of 1.52. Patient with no other electrolyte abnormalities or concerning findings on his lab evaluation. Chest XR was negative for acute intrathoracic issues. Patient counseled to follow up closely with his electrophysiologist here in the next several days, hopefully in the next 24-48 hours to consider ablation. Patient does seem that he would be interested in re-discussing the option of an ablation at this time. Patient did take that single dose of metoprolol and resolution of tachycardia while in the ED, likely the metoprolol taking effect.  Did receive a full dose of aspirin. On vitals recheck, patient is vitally stable and appropriate for discharge. All questions answered prior to discharge. Understood close return precautions if he were to develop chest pain or shortness of breath.    Diagnosis:    ICD-10-CM    1. Acute chest pain R07.9 D dimer quantitative     Follow-Up with Electrophysiologist   2. Palpitations R00.2 Follow-Up with Electrophysiologist   3. H/O supraventricular tachycardia Z86.79 Follow-Up with Electrophysiologist     Disposition:  discharged to home    Scribe Disclosure:  I, Hoa Fam, am serving as a scribe at 11:55 PM on 10/24/2017 to document services personally performed by Matthew Rockwell MD based on my observations and the provider's statements to me.    10/24/2017    EMERGENCY DEPARTMENT        Matthew Rockwell MD  10/25/17 0135     10